# Patient Record
Sex: MALE | Race: ASIAN | NOT HISPANIC OR LATINO | URBAN - METROPOLITAN AREA
[De-identification: names, ages, dates, MRNs, and addresses within clinical notes are randomized per-mention and may not be internally consistent; named-entity substitution may affect disease eponyms.]

---

## 2024-11-07 ENCOUNTER — INPATIENT (INPATIENT)
Facility: HOSPITAL | Age: 69
LOS: 0 days | Discharge: HOME | End: 2024-11-08
Attending: STUDENT IN AN ORGANIZED HEALTH CARE EDUCATION/TRAINING PROGRAM | Admitting: STUDENT IN AN ORGANIZED HEALTH CARE EDUCATION/TRAINING PROGRAM
Payer: COMMERCIAL

## 2024-11-07 VITALS
WEIGHT: 130.95 LBS | HEIGHT: 62 IN | DIASTOLIC BLOOD PRESSURE: 90 MMHG | TEMPERATURE: 98 F | RESPIRATION RATE: 16 BRPM | OXYGEN SATURATION: 96 % | SYSTOLIC BLOOD PRESSURE: 187 MMHG | HEART RATE: 95 BPM

## 2024-11-07 DIAGNOSIS — R07.9 CHEST PAIN, UNSPECIFIED: ICD-10-CM

## 2024-11-07 DIAGNOSIS — Z98.890 OTHER SPECIFIED POSTPROCEDURAL STATES: Chronic | ICD-10-CM

## 2024-11-07 DIAGNOSIS — I10 ESSENTIAL (PRIMARY) HYPERTENSION: ICD-10-CM

## 2024-11-07 DIAGNOSIS — H72.90 UNSPECIFIED PERFORATION OF TYMPANIC MEMBRANE, UNSPECIFIED EAR: Chronic | ICD-10-CM

## 2024-11-07 DIAGNOSIS — Z98.62 PERIPHERAL VASCULAR ANGIOPLASTY STATUS: Chronic | ICD-10-CM

## 2024-11-07 DIAGNOSIS — E78.5 HYPERLIPIDEMIA, UNSPECIFIED: ICD-10-CM

## 2024-11-07 DIAGNOSIS — E11.65 TYPE 2 DIABETES MELLITUS WITH HYPERGLYCEMIA: ICD-10-CM

## 2024-11-07 LAB
A1C WITH ESTIMATED AVERAGE GLUCOSE RESULT: 8.3 % — HIGH (ref 4–5.6)
ADD ON TEST-SPECIMEN IN LAB: SIGNIFICANT CHANGE UP
ANION GAP SERPL CALC-SCNC: 17 MMOL/L — HIGH (ref 7–14)
BUN SERPL-MCNC: 18 MG/DL — SIGNIFICANT CHANGE UP (ref 7–23)
CALCIUM SERPL-MCNC: 9.8 MG/DL — SIGNIFICANT CHANGE UP (ref 8.4–10.5)
CHLORIDE SERPL-SCNC: 99 MMOL/L — SIGNIFICANT CHANGE UP (ref 98–107)
CO2 SERPL-SCNC: 22 MMOL/L — SIGNIFICANT CHANGE UP (ref 22–31)
CREAT SERPL-MCNC: 1.41 MG/DL — HIGH (ref 0.5–1.3)
EGFR: 54 ML/MIN/1.73M2 — LOW
ESTIMATED AVERAGE GLUCOSE: 192 — SIGNIFICANT CHANGE UP
GLUCOSE BLDC GLUCOMTR-MCNC: 138 MG/DL — HIGH (ref 70–99)
GLUCOSE BLDC GLUCOMTR-MCNC: 163 MG/DL — HIGH (ref 70–99)
GLUCOSE BLDC GLUCOMTR-MCNC: 169 MG/DL — HIGH (ref 70–99)
GLUCOSE BLDC GLUCOMTR-MCNC: 179 MG/DL — HIGH (ref 70–99)
GLUCOSE BLDC GLUCOMTR-MCNC: 224 MG/DL — HIGH (ref 70–99)
GLUCOSE SERPL-MCNC: 176 MG/DL — HIGH (ref 70–99)
HCT VFR BLD CALC: 50.1 % — HIGH (ref 39–50)
HGB BLD-MCNC: 16.7 G/DL — SIGNIFICANT CHANGE UP (ref 13–17)
MCHC RBC-ENTMCNC: 32.2 PG — SIGNIFICANT CHANGE UP (ref 27–34)
MCHC RBC-ENTMCNC: 33.3 G/DL — SIGNIFICANT CHANGE UP (ref 32–36)
MCV RBC AUTO: 96.7 FL — SIGNIFICANT CHANGE UP (ref 80–100)
NRBC # BLD: 0 /100 WBCS — SIGNIFICANT CHANGE UP (ref 0–0)
NRBC # FLD: 0 K/UL — SIGNIFICANT CHANGE UP (ref 0–0)
PLATELET # BLD AUTO: 123 K/UL — LOW (ref 150–400)
POTASSIUM SERPL-MCNC: 4.3 MMOL/L — SIGNIFICANT CHANGE UP (ref 3.5–5.3)
POTASSIUM SERPL-SCNC: 4.3 MMOL/L — SIGNIFICANT CHANGE UP (ref 3.5–5.3)
RBC # BLD: 5.18 M/UL — SIGNIFICANT CHANGE UP (ref 4.2–5.8)
RBC # FLD: 12.7 % — SIGNIFICANT CHANGE UP (ref 10.3–14.5)
SODIUM SERPL-SCNC: 138 MMOL/L — SIGNIFICANT CHANGE UP (ref 135–145)
WBC # BLD: 6.61 K/UL — SIGNIFICANT CHANGE UP (ref 3.8–10.5)
WBC # FLD AUTO: 6.61 K/UL — SIGNIFICANT CHANGE UP (ref 3.8–10.5)

## 2024-11-07 PROCEDURE — 99254 IP/OBS CNSLTJ NEW/EST MOD 60: CPT | Mod: GC

## 2024-11-07 PROCEDURE — 99232 SBSQ HOSP IP/OBS MODERATE 35: CPT

## 2024-11-07 PROCEDURE — 93010 ELECTROCARDIOGRAM REPORT: CPT

## 2024-11-07 RX ORDER — SODIUM CHLORIDE 9 MG/ML
1000 INJECTION, SOLUTION INTRAMUSCULAR; INTRAVENOUS; SUBCUTANEOUS
Refills: 0 | Status: DISCONTINUED | OUTPATIENT
Start: 2024-11-07 | End: 2024-11-08

## 2024-11-07 RX ORDER — LOSARTAN POTASSIUM 25 MG/1
100 TABLET ORAL DAILY
Refills: 0 | Status: DISCONTINUED | OUTPATIENT
Start: 2024-11-07 | End: 2024-11-08

## 2024-11-07 RX ORDER — INSULIN GLARGINE,HUM.REC.ANLOG 100/ML
32 VIAL (ML) SUBCUTANEOUS AT BEDTIME
Refills: 0 | Status: DISCONTINUED | OUTPATIENT
Start: 2024-11-07 | End: 2024-11-07

## 2024-11-07 RX ORDER — SODIUM CHLORIDE 9 MG/ML
500 INJECTION, SOLUTION INTRAMUSCULAR; INTRAVENOUS; SUBCUTANEOUS
Refills: 0 | Status: DISCONTINUED | OUTPATIENT
Start: 2024-11-07 | End: 2024-11-08

## 2024-11-07 RX ORDER — MONTELUKAST SODIUM 10 MG/1
10 TABLET, FILM COATED ORAL AT BEDTIME
Refills: 0 | Status: DISCONTINUED | OUTPATIENT
Start: 2024-11-07 | End: 2024-11-08

## 2024-11-07 RX ORDER — CLOPIDOGREL 75 MG/1
75 TABLET ORAL DAILY
Refills: 0 | Status: DISCONTINUED | OUTPATIENT
Start: 2024-11-08 | End: 2024-11-08

## 2024-11-07 RX ORDER — INSULIN GLARGINE,HUM.REC.ANLOG 100/ML
34 VIAL (ML) SUBCUTANEOUS AT BEDTIME
Refills: 0 | Status: DISCONTINUED | OUTPATIENT
Start: 2024-11-07 | End: 2024-11-08

## 2024-11-07 RX ORDER — INSULIN LISPRO 100/ML
6 VIAL (ML) SUBCUTANEOUS
Refills: 0 | Status: DISCONTINUED | OUTPATIENT
Start: 2024-11-07 | End: 2024-11-08

## 2024-11-07 RX ORDER — GABAPENTIN 300 MG/1
1 CAPSULE ORAL
Refills: 0 | DISCHARGE

## 2024-11-07 RX ORDER — SODIUM CHLORIDE 9 MG/ML
250 INJECTION, SOLUTION INTRAMUSCULAR; INTRAVENOUS; SUBCUTANEOUS ONCE
Refills: 0 | Status: COMPLETED | OUTPATIENT
Start: 2024-11-07 | End: 2024-11-07

## 2024-11-07 RX ORDER — FLUTICASONE FUROATE AND VILANTEROL 100; 25 UG/1; UG/1
1 POWDER RESPIRATORY (INHALATION)
Refills: 0 | DISCHARGE

## 2024-11-07 RX ORDER — GABAPENTIN 300 MG/1
300 CAPSULE ORAL DAILY
Refills: 0 | Status: DISCONTINUED | OUTPATIENT
Start: 2024-11-07 | End: 2024-11-08

## 2024-11-07 RX ORDER — INSULIN LISPRO 100/ML
VIAL (ML) SUBCUTANEOUS
Refills: 0 | Status: DISCONTINUED | OUTPATIENT
Start: 2024-11-07 | End: 2024-11-08

## 2024-11-07 RX ORDER — FLUTICASONE PROPIONATE AND SALMETEROL XINAFOATE 230; 21 UG/1; UG/1
1 AEROSOL, METERED RESPIRATORY (INHALATION)
Refills: 0 | Status: DISCONTINUED | OUTPATIENT
Start: 2024-11-07 | End: 2024-11-08

## 2024-11-07 RX ORDER — PANTOPRAZOLE SODIUM 40 MG/1
1 TABLET, DELAYED RELEASE ORAL
Refills: 0 | DISCHARGE

## 2024-11-07 RX ORDER — SODIUM CHLORIDE 9 MG/ML
3 INJECTION, SOLUTION INTRAMUSCULAR; INTRAVENOUS; SUBCUTANEOUS EVERY 8 HOURS
Refills: 0 | Status: DISCONTINUED | OUTPATIENT
Start: 2024-11-07 | End: 2024-11-08

## 2024-11-07 RX ORDER — INSULIN LISPRO 100/ML
VIAL (ML) SUBCUTANEOUS AT BEDTIME
Refills: 0 | Status: DISCONTINUED | OUTPATIENT
Start: 2024-11-07 | End: 2024-11-08

## 2024-11-07 RX ORDER — PANTOPRAZOLE SODIUM 40 MG/1
40 TABLET, DELAYED RELEASE ORAL
Refills: 0 | Status: DISCONTINUED | OUTPATIENT
Start: 2024-11-07 | End: 2024-11-08

## 2024-11-07 RX ORDER — MONTELUKAST SODIUM 10 MG/1
1 TABLET, FILM COATED ORAL
Refills: 0 | DISCHARGE

## 2024-11-07 RX ORDER — GLUCAGON INJECTION, SOLUTION 1 MG/.2ML
1 INJECTION, SOLUTION SUBCUTANEOUS ONCE
Refills: 0 | Status: DISCONTINUED | OUTPATIENT
Start: 2024-11-07 | End: 2024-11-08

## 2024-11-07 RX ORDER — LOSARTAN POTASSIUM 25 MG/1
1 TABLET ORAL
Refills: 0 | DISCHARGE

## 2024-11-07 RX ORDER — ASPIRIN/MAG CARB/ALUMINUM AMIN 325 MG
81 TABLET ORAL DAILY
Refills: 0 | Status: DISCONTINUED | OUTPATIENT
Start: 2024-11-08 | End: 2024-11-08

## 2024-11-07 RX ADMIN — Medication 2: at 15:01

## 2024-11-07 RX ADMIN — GABAPENTIN 300 MILLIGRAM(S): 300 CAPSULE ORAL at 15:00

## 2024-11-07 RX ADMIN — LOSARTAN POTASSIUM 100 MILLIGRAM(S): 25 TABLET ORAL at 15:00

## 2024-11-07 RX ADMIN — SODIUM CHLORIDE 3 MILLILITER(S): 9 INJECTION, SOLUTION INTRAMUSCULAR; INTRAVENOUS; SUBCUTANEOUS at 22:11

## 2024-11-07 RX ADMIN — Medication 40 MILLIGRAM(S): at 22:22

## 2024-11-07 RX ADMIN — SODIUM CHLORIDE 1000 MILLILITER(S): 9 INJECTION, SOLUTION INTRAMUSCULAR; INTRAVENOUS; SUBCUTANEOUS at 08:04

## 2024-11-07 RX ADMIN — Medication 1: at 17:28

## 2024-11-07 RX ADMIN — SODIUM CHLORIDE 75 MILLILITER(S): 9 INJECTION, SOLUTION INTRAMUSCULAR; INTRAVENOUS; SUBCUTANEOUS at 08:04

## 2024-11-07 RX ADMIN — Medication 34 UNIT(S): at 22:36

## 2024-11-07 RX ADMIN — PANTOPRAZOLE SODIUM 40 MILLIGRAM(S): 40 TABLET, DELAYED RELEASE ORAL at 15:00

## 2024-11-07 RX ADMIN — SODIUM CHLORIDE 100 MILLILITER(S): 9 INJECTION, SOLUTION INTRAMUSCULAR; INTRAVENOUS; SUBCUTANEOUS at 11:20

## 2024-11-07 RX ADMIN — SODIUM CHLORIDE 3 MILLILITER(S): 9 INJECTION, SOLUTION INTRAMUSCULAR; INTRAVENOUS; SUBCUTANEOUS at 14:00

## 2024-11-07 RX ADMIN — FLUTICASONE PROPIONATE AND SALMETEROL XINAFOATE 1 DOSE(S): 230; 21 AEROSOL, METERED RESPIRATORY (INHALATION) at 22:22

## 2024-11-07 RX ADMIN — MONTELUKAST SODIUM 10 MILLIGRAM(S): 10 TABLET, FILM COATED ORAL at 22:22

## 2024-11-07 NOTE — CHART NOTE - NSCHARTNOTEFT_GEN_A_CORE
The patient is s/p PCI to LAD. He is on Xarelto 2.5 mg PO BID for PAD. As per Dr. Phillips, will d/c Xarelto and start the patient on ASA + Plavix.

## 2024-11-07 NOTE — ADVANCED PRACTICE NURSE CONSULT - REASON FOR CONSULT
69 y.o. male with PMH of HTN, HLD, Diabetes Mellitus 2 (on Insulin pump), PAD on Xarelto, COPD, [place], Peripheral neuropathy-  presents today for elective cardiac catheterization due to abnormal stress test.  [facility] uses simplicity insulin pump at home.

## 2024-11-07 NOTE — CONSULT NOTE ADULT - SUBJECTIVE AND OBJECTIVE BOX
Oseas Zuniga MD   |   PGY-4  Endocrinology Fellow  Available on Microsoft Teams    HPI:  69 y.o. male with PMH of HTN, HLD, Diabetes Mellitus 2 (on Insulin pump), PAD on Xarelto, COPD, [place], Peripheral neuropathy-  presents today for elective cardiac catheterization due to abnormal stress test.  The patient denies chest pain, SOB, palpitations, presyncope, syncope,  headache, visual disturbances, CVA, PE, DVT, JANI, abdominal pain, N/V/D/C, hematochezia, melena, dysuria, hematuria, fever, chills. The patient was evaluated by a cardiologist. Due to patient's symptoms and abnormal non invasive findings, the patient  was recommended to have cardiac catheterization. The patient denies any complaints at present.     referring physician, Dr. Shaik Phillips  stress test 10/19/2024 - EF 64%, anteroseptal wall moderate ischemia perfusion defect.   (07 Nov 2024 07:48)      Endocrine History:  Endocrine consulted T2DM/insulin patch monitoring.      --Type of Diabetes: Not sure what type   --Diagnosis: Since age 40s   --A1c: 8.3%   --Outpatient regimen: Cecur Symplicity patch (8 units before breakfast, 12 units before lunch, does take it before dinner because he was told doesn’t needle, Toujeo 36 units before dinner (last night took 32 units)  Synjardy(Empagliflozin/metformin) 12.5/500 mg PO daily   --Endocrinologist: Dr Eliane Castro  --Adherence: Good   --Side effects to medications: Patient denies diarrhea, decreased appetite, nausea/vomiting, weight gain/loss, lower leg swelling, UTIs, pancreatitis, thyroid cancer, family hx of thyroid Cancer/MEN   --Past Medications: metformin – stopped when changed to synjardy   --who administers medications? Himself   --live with: WIfe   --Home sugars/log: Dexcom G7 – 100s -120 in AM. Sometimes in 200s after meals   --Any hypoglycemic episodes? Denies    --Any hx of DKA? Denies   --Complications: Reports PAD. Denies MI, CVA, Retinopathy, Neuropathy,  Nephropathy   --Family history: Mother – not sure what type   --Outpatient Diet: Denies juice, soda or sweetened iced tea/drinks. Denies sweets, candies, desserts, chips for snacks. Denies take-out or fast food. Eats Vegetables daily. Some chicken, fish. Small rice with lunch and dinner   --Appetite: Good   --% of meals eaten today/yesterday? No breakfast, ate lunch   --Follow up: Dr. Eliane Castro – has appt in 2 weeks   --Insurance: [place] [id number]   --Statin: atorvastatin 40mg   --ACE/ARB: losartan 100mg--Inpatient diabetes regimen? 32/low/low   --BMI:   24      weight: 59.4   --GFR? 54     PAST MEDICAL & SURGICAL HISTORY:  HTN (hypertension), benign      HLD (hyperlipidemia)      DM2 (diabetes mellitus, type 2)      Chronic obstructive pulmonary disease, unspecified COPD type      Peripheral neuropathy      [place] (hard of hearing)      Diabetes mellitus type 2, uncomplicated, on long term insulin pump      Perforated eardrum      H/O foot surgery      Status post peripheral artery angioplasty          MEDICATIONS  (STANDING):  atorvastatin 40 milliGRAM(s) Oral at bedtime  dextrose 5%. 1000 milliLiter(s) (50 mL/Hr) IV Continuous <Continuous>  dextrose 5%. 1000 milliLiter(s) (100 mL/Hr) IV Continuous <Continuous>  dextrose 50% Injectable 25 Gram(s) IV Push once  dextrose 50% Injectable 25 Gram(s) IV Push once  dextrose 50% Injectable 12.5 Gram(s) IV Push once  fluticasone propionate/ salmeterol 100-50 MICROgram(s) Diskus 1 Dose(s) Inhalation two times a day  gabapentin 300 milliGRAM(s) Oral daily  glucagon  Injectable 1 milliGRAM(s) IntraMuscular once  insulin glargine Injectable (LANTUS) 34 Unit(s) SubCutaneous at bedtime  insulin lispro (ADMELOG) corrective regimen sliding scale   SubCutaneous three times a day before meals  insulin lispro (ADMELOG) corrective regimen sliding scale   SubCutaneous at bedtime  insulin lispro Injectable (ADMELOG) 6 Unit(s) SubCutaneous three times a day before meals  losartan 100 milliGRAM(s) Oral daily  montelukast 10 milliGRAM(s) Oral at bedtime  pantoprazole    Tablet 40 milliGRAM(s) Oral before breakfast  sodium chloride 0.9% lock flush 3 milliLiter(s) IV Push every 8 hours  sodium chloride 0.9%. 500 milliLiter(s) (75 mL/Hr) IV Continuous <Continuous>  sodium chloride 0.9%. 1000 milliLiter(s) (100 mL/Hr) IV Continuous <Continuous>    MEDICATIONS  (PRN):  dextrose Oral Gel 15 Gram(s) Oral once PRN Blood Glucose LESS THAN 70 milliGRAM(s)/deciliter      Allergies    pork (Urticaria)  Beef (Urticaria)  No Known Drug Allergies    Intolerances      Review of Systems:  Constitutional: No fever  Eyes: No blurry vision  Neuro: No tremors  HEENT: No pain  Cardiovascular: No chest pain, palpitations  Respiratory: No SOB, no cough  GI: No nausea, vomiting, abdominal pain  : No dysuria  Skin: no rash  Psych: no depression  Endocrine: no polyuria, polydipsia  Hem/lymph: no swelling  Osteoporosis: no fractures    ===================PHYSICAL EXAM=======================  VITALS: T(C): 36.6 (11-07-24 @ 14:41)  T(F): 97.9 (11-07-24 @ 14:41), Max: 98.1 (11-07-24 @ 07:12)  HR: 84 (11-07-24 @ 14:41) (84 - 96)  BP: 177/96 (11-07-24 @ 14:41) (158/85 - 187/90)  RR:  (14 - 22)  SpO2:  (96% - 99%)  Wt(kg): --  GENERAL: NAD  EYES: No proptosis, no lid lag, anicteric  THYROID: Normal size, no palpable nodules  RESPIRATORY: Clear to auscultation bilaterally  CARDIOVASCULAR: Regular rate and rhythm  GI: Soft, nontender, non distended  EXT: b/l feet without wounds; 2+ pulses  PSYCH: Alert and oriented x 3, reactive mood  ======================================================  POCT Blood Glucose.: 179 mg/dL (11-07-24 @ 17:23)  POCT Blood Glucose.: 224 mg/dL (11-07-24 @ 14:17)  POCT Blood Glucose.: 138 mg/dL (11-07-24 @ 09:47)  POCT Blood Glucose.: 163 mg/dL (11-07-24 @ 07:11)                            16.7   6.61  )-----------( 123      ( 07 Nov 2024 07:12 )             50.1       11-07    138  |  99  |  18  ----------------------------<  176[H]  4.3   |  22  |  1.41[H]    eGFR: 54[L]    Ca    9.8      11-07        Thyroid Function Tests:      A1C with Estimated Average Glucose Result: 8.3 % (11-07-24 @ 07:12)          Radiology:              Oseas Zuniga MD   |   PGY-4  Endocrinology Fellow  Available on Microsoft Teams    HPI:  69 y.o. male with PMH of HTN, HLD, Diabetes Mellitus 2 (on Insulin pump), PAD on Xarelto, COPD, [place], Peripheral neuropathy-  presents today for elective cardiac catheterization due to abnormal stress test.  The patient denies chest pain, SOB, palpitations, presyncope, syncope,  headache, visual disturbances, CVA, PE, DVT, JANI, abdominal pain, N/V/D/C, hematochezia, melena, dysuria, hematuria, fever, chills. The patient was evaluated by a cardiologist. Due to patient's symptoms and abnormal non invasive findings, the patient  was recommended to have cardiac catheterization. The patient denies any complaints at present.     referring physician, Dr. Shaik Phillips  stress test 10/19/2024 - EF 64%, anteroseptal wall moderate ischemia perfusion defect.   (07 Nov 2024 07:48)      Endocrine History:  Endocrine consulted T2DM/insulin patch monitoring.      --Type of Diabetes: Not sure what type   --Diagnosis: Since age 40s   --A1c: 8.3%   --Outpatient regimen: Cequr Symplicity patch (8 units before breakfast, 12 units before lunch, does take it before dinner because he was told doesn’t needle, Toujeo 36 units before dinner (last night took 32 units)  Synjardy(Empagliflozin/metformin) 12.5/500 mg PO daily   --Endocrinologist: Dr Eliane Castro  --Adherence: Good   --Side effects to medications: Patient denies diarrhea, decreased appetite, nausea/vomiting, weight gain/loss, lower leg swelling, UTIs, pancreatitis, thyroid cancer, family hx of thyroid Cancer/MEN   --Past Medications: metformin – stopped when changed to synjardy   --who administers medications? Himself   --live with: WIfe   --Home sugars/log: Dexcom G7 – 100s -120 in AM. Sometimes in 200s after meals   --Any hypoglycemic episodes? Denies    --Any hx of DKA? Denies   --Complications: Reports PAD. Denies MI, CVA, Retinopathy, Neuropathy,  Nephropathy   --Family history: Mother – not sure what type   --Outpatient Diet: Denies juice, soda or sweetened iced tea/drinks. Denies sweets, candies, desserts, chips for snacks. Denies take-out or fast food. Eats Vegetables daily. Some chicken, fish. Small rice with lunch and dinner   --Appetite: Good   --% of meals eaten today/yesterday? No breakfast, ate lunch   --Follow up: Dr. Eliane Castro – has appt in 2 weeks   --Insurance: [place] [id number]   --Statin: atorvastatin 40mg   --ACE/ARB: losartan 100mg--Inpatient diabetes regimen? 32/low/low   --BMI:   24      weight: 59.4   --GFR? 54     PAST MEDICAL & SURGICAL HISTORY:  HTN (hypertension), benign      HLD (hyperlipidemia)      DM2 (diabetes mellitus, type 2)      Chronic obstructive pulmonary disease, unspecified COPD type      Peripheral neuropathy      [place] (hard of hearing)      Diabetes mellitus type 2, uncomplicated, on long term insulin pump      Perforated eardrum      H/O foot surgery      Status post peripheral artery angioplasty          MEDICATIONS  (STANDING):  atorvastatin 40 milliGRAM(s) Oral at bedtime  dextrose 5%. 1000 milliLiter(s) (50 mL/Hr) IV Continuous <Continuous>  dextrose 5%. 1000 milliLiter(s) (100 mL/Hr) IV Continuous <Continuous>  dextrose 50% Injectable 25 Gram(s) IV Push once  dextrose 50% Injectable 25 Gram(s) IV Push once  dextrose 50% Injectable 12.5 Gram(s) IV Push once  fluticasone propionate/ salmeterol 100-50 MICROgram(s) Diskus 1 Dose(s) Inhalation two times a day  gabapentin 300 milliGRAM(s) Oral daily  glucagon  Injectable 1 milliGRAM(s) IntraMuscular once  insulin glargine Injectable (LANTUS) 34 Unit(s) SubCutaneous at bedtime  insulin lispro (ADMELOG) corrective regimen sliding scale   SubCutaneous three times a day before meals  insulin lispro (ADMELOG) corrective regimen sliding scale   SubCutaneous at bedtime  insulin lispro Injectable (ADMELOG) 6 Unit(s) SubCutaneous three times a day before meals  losartan 100 milliGRAM(s) Oral daily  montelukast 10 milliGRAM(s) Oral at bedtime  pantoprazole    Tablet 40 milliGRAM(s) Oral before breakfast  sodium chloride 0.9% lock flush 3 milliLiter(s) IV Push every 8 hours  sodium chloride 0.9%. 500 milliLiter(s) (75 mL/Hr) IV Continuous <Continuous>  sodium chloride 0.9%. 1000 milliLiter(s) (100 mL/Hr) IV Continuous <Continuous>    MEDICATIONS  (PRN):  dextrose Oral Gel 15 Gram(s) Oral once PRN Blood Glucose LESS THAN 70 milliGRAM(s)/deciliter      Allergies    pork (Urticaria)  Beef (Urticaria)  No Known Drug Allergies    Intolerances      Review of Systems:  Constitutional: No fever  Eyes: No blurry vision  Neuro: No tremors  HEENT: No pain  Cardiovascular: No chest pain, palpitations  Respiratory: No SOB, no cough  GI: No nausea, vomiting, abdominal pain  : No dysuria  Skin: no rash  Psych: no depression  Endocrine: no polyuria, polydipsia  Hem/lymph: no swelling  Osteoporosis: no fractures    ===================PHYSICAL EXAM=======================  VITALS: T(C): 36.6 (11-07-24 @ 14:41)  T(F): 97.9 (11-07-24 @ 14:41), Max: 98.1 (11-07-24 @ 07:12)  HR: 84 (11-07-24 @ 14:41) (84 - 96)  BP: 177/96 (11-07-24 @ 14:41) (158/85 - 187/90)  RR:  (14 - 22)  SpO2:  (96% - 99%)  Wt(kg): --  GENERAL: NAD  EYES: No proptosis, no lid lag, anicteric  THYROID: Normal size, no palpable nodules  RESPIRATORY: Clear to auscultation bilaterally  CARDIOVASCULAR: Regular rate and rhythm  GI: Soft, nontender, non distended  EXT: b/l feet without wounds; 2+ pulses  PSYCH: Alert and oriented x 3, reactive mood  ======================================================  POCT Blood Glucose.: 179 mg/dL (11-07-24 @ 17:23)  POCT Blood Glucose.: 224 mg/dL (11-07-24 @ 14:17)  POCT Blood Glucose.: 138 mg/dL (11-07-24 @ 09:47)  POCT Blood Glucose.: 163 mg/dL (11-07-24 @ 07:11)                            16.7   6.61  )-----------( 123      ( 07 Nov 2024 07:12 )             50.1       11-07    138  |  99  |  18  ----------------------------<  176[H]  4.3   |  22  |  1.41[H]    eGFR: 54[L]    Ca    9.8      11-07        Thyroid Function Tests:      A1C with Estimated Average Glucose Result: 8.3 % (11-07-24 @ 07:12)          Radiology:

## 2024-11-07 NOTE — ADVANCED PRACTICE NURSE CONSULT - RECOMMEDATIONS
If patient with be admitted, endocrine team needs to be consulted. Patient instructed to follow up with endocrinologist. Patient stated he does have an appointment scheduled.

## 2024-11-07 NOTE — H&P CARDIOLOGY - NSICDXPASTMEDICALHX_GEN_ALL_CORE_FT
PAST MEDICAL HISTORY:  Chronic obstructive pulmonary disease, unspecified COPD type     Diabetes mellitus type 2, uncomplicated, on long term insulin pump     DM2 (diabetes mellitus, type 2)     HLD (hyperlipidemia)     [place] (hard of hearing)     HTN (hypertension), benign     Peripheral neuropathy

## 2024-11-07 NOTE — H&P CARDIOLOGY - NSICDXPASTSURGICALHX_GEN_ALL_CORE_FT
PAST SURGICAL HISTORY:  H/O foot surgery     Perforated eardrum     Status post peripheral artery angioplasty

## 2024-11-07 NOTE — ADVANCED PRACTICE NURSE CONSULT - ASSESSMENT
Saw patient prior to angiogram. Patient with type 2 diabetes. Patient was on oral medication for few years, has been on insulin for 10-12 years and now on Simplicity insulin pump for last 6 months. Patient takes up to 6 units ac breakfast and lunch. Patient takes Toujeo with dinner and does not take any rapid acting insulin with dinner. Patient simplicity pump located in left abdomen ; site is intact. Patient due to change today. Will be removed prior to the procedure and patient can be restarted at home if the plan is to go home after the procedure. Ina not available at time of consult for full medication review. Patient has hypoglycemia unawareness and feels s/s of low blood sugar at 60mg/dl or below. patient states BG has gone in 40’s. Patient has a Dex Com CGM which he states gives him enough warning of low blood sugars. Patient was instructed that the CGM could be set at a different number if patient was not feeling he was getting enough warning. Patient has Dex Com CGM located on right arm; site intact. Patient was instructed that for more accurate readings CGM should be placed in the back of the arm. Patient stated that it is hard for him to take it off in the back of the arm that is why he put it in this area. Patient was instructed that if he ever get a discrepancy between BG not matched symptoms or not matching blood sugar readings it might be site related. Patient just eats when BG is low. Patient was instructed on proper treatment of hypoglycemia. Patient demonstrated understanding of information taught.

## 2024-11-07 NOTE — H&P CARDIOLOGY - COMMENTS
Pre-sedation evaluation    Dentures: none  Last PO intake:11/6/24 19:00    Level of consciousness: 1  Obstructive sleep apnea: No  Aspiration risk: No  Mallampati score: 2  ASA Classification: 2  Prior Sedative or Anesthesia Experience: No complications  Informed consent by responsible adult: Yes  Responsible adult escort: Yes  Based on today's assessment, anesthesia consult requested: No

## 2024-11-07 NOTE — CONSULT NOTE ADULT - ATTENDING COMMENTS
69M DM2 on Cequr simplicity patch pump here for workup of abnormal stress test. Inpatient off patch pump. Agree with basal bolus insulin as outlined,     Radha Nagy MD  Division of Endocrinology  Pager: [phone]    If after 6PM or before 9AM, or on weekends/holidays, please call endocrine answering service for assistance ([phone]).  For nonurgent matters email [email or web address] for assistance.

## 2024-11-07 NOTE — H&P CARDIOLOGY - HISTORY OF PRESENT ILLNESS
69 y.o. male with PMH of HTN, HLD, Diabetes Mellitus 2 (on Insulin pump), PAD on Xarelto, COPD, [place], Peripheral neuropathy-  presents today for elective cardiac catheterization due to abnormal stress test.  The patient denies chest pain, SOB, palpitations, presyncope, syncope,  headache, visual disturbances, CVA, PE, DVT, JANI, abdominal pain, N/V/D/C, hematochezia, melena, dysuria, hematuria, fever, chills. The patient was evaluated by a cardiologist. Due to patient's symptoms and abnormal non invasive findings, the patient  was recommended to have cardiac catheterization. The patient denies any complaints at present.     referring physician, Dr. Shaik Phillips  stress test 10/19/2024 - EF 64%, anteroseptal wall moderate ischemia perfusion defect.

## 2024-11-07 NOTE — CONSULT NOTE ADULT - ASSESSMENT
69M hx of HTN, HLD, Diabetes Mellitus 2 (on Insulin patch), PAD on Xarelto, COPD, [place], Peripheral neuropathy-  presents today for elective cardiac catheterization due to abnormal stress test. Patient to be admitted. Endocrine consulted T2DM/insulin patch monitoring.     #T2DM (per chart review - patient not sure what type of DM he has)  --Diagnosis: Since age 40s   --A1c: 8.3%   --Outpatient regimen: Cecur Symplicity patch (8 units before breakfast, 12 units before lunch, does take it before dinner because he was told doesn’t needle, Toujeo 36 units before dinner (last night took 32 units)  Synjardy(Empagliflozin/metformin) 12.5/500 mg PO daily     INPATIENT RECOMMENDATIONS:  - start Lantus 34 units at bedtime  - start Admelog 6 units premeal TID (HOLD IF NPO)  - start low admelog correctional scale premeal   - start low admelog correctional scale at bedtime    DISCHARGE RECOMMENDATIONS:  - Patient should start taking premeal insulin before dinner (currently only taking before breakfast and lunch via Cecur Symplicity patch), Continue Toujeo 36 units at bedtime and Synjardy(Empagliflozin/metformin) 12.5/500 mg PO daily   - CGM: Patient uses Dexcom G7 at home  - OUT-PATIENT FOLLOW UP: Patient should follow up with Endocrinologist Dr Eliane Castro    #HLD  - patient on atorvastatin 40mg  - goal LDL in diabetes mellitus is <70    #HTN  - patient on losartan 100mg daily  - goal BP in diabetes mellitus is <130/80  - defer to primary team for management      Discussed recommendations with primary team.    Oseas Zuniga MD  Endocrine Fellow  Can be reached via [employer] teams.    For follow up questions, discharge recommendations, or new consults, please email [email or web address] ([email or web address]) or [email or web address] ([facility]) or call answering service at [phone] (weekdays); [phone] (nights/weekends).  For emergencies please page fellow on call.     69M hx of HTN, HLD, Diabetes Mellitus 2 (on Insulin patch), PAD on Xarelto, COPD, [place], Peripheral neuropathy-  presents today for elective cardiac catheterization due to abnormal stress test. Patient to be admitted. Endocrine consulted T2DM/insulin patch monitoring.     #T2DM (per chart review - patient not sure what type of DM he has)  --Diagnosis: Since age 40s   --A1c: 8.3%   --Outpatient regimen: Cequr Symplicity patch (8 units before breakfast, 12 units before lunch, does take it before dinner because he was told doesn’t needle, Toujeo 36 units before dinner (last night took 32 units)  Synjardy(Empagliflozin/metformin) 12.5/500 mg PO daily     INPATIENT RECOMMENDATIONS: Cequr patch is currently off  - start Lantus 34 units at bedtime  - start Admelog 6 units premeal TID (HOLD IF NPO)  - start low admelog correctional scale premeal   - start low admelog correctional scale at bedtime    DISCHARGE RECOMMENDATIONS:  - Patient should start taking premeal insulin before dinner (currently only taking before breakfast and lunch via Cequr Symplicity patch), Continue Toujeo 36 units at bedtime and Synjardy(Empagliflozin/metformin) 12.5/500 mg PO daily   - CGM: Patient uses Dexcom G7 at home  - OUT-PATIENT FOLLOW UP: Patient should follow up with Endocrinologist Dr Eliane Castro    #HLD  - patient on atorvastatin 40mg  - goal LDL in diabetes mellitus is <70    #HTN  - patient on losartan 100mg daily  - goal BP in diabetes mellitus is <130/80  - defer to primary team for management      Discussed recommendations with primary team.    Oseas Zuniga MD  Endocrine Fellow  Can be reached via [employer] teams.    For follow up questions, discharge recommendations, or new consults, please email [email or web address] ([email or web address]) or [email or web address] ([facility]) or call answering service at [phone] (weekdays); [phone] (nights/weekends).  For emergencies please page fellow on call.

## 2024-11-08 ENCOUNTER — TRANSCRIPTION ENCOUNTER (OUTPATIENT)
Age: 69
End: 2024-11-08

## 2024-11-08 VITALS
DIASTOLIC BLOOD PRESSURE: 92 MMHG | RESPIRATION RATE: 20 BRPM | HEART RATE: 90 BPM | SYSTOLIC BLOOD PRESSURE: 149 MMHG | OXYGEN SATURATION: 100 %

## 2024-11-08 LAB
A1C WITH ESTIMATED AVERAGE GLUCOSE RESULT: 8.1 % — HIGH (ref 4–5.6)
ANION GAP SERPL CALC-SCNC: 12 MMOL/L — SIGNIFICANT CHANGE UP (ref 7–14)
BUN SERPL-MCNC: 23 MG/DL — SIGNIFICANT CHANGE UP (ref 7–23)
CALCIUM SERPL-MCNC: 9.4 MG/DL — SIGNIFICANT CHANGE UP (ref 8.4–10.5)
CHLORIDE SERPL-SCNC: 103 MMOL/L — SIGNIFICANT CHANGE UP (ref 98–107)
CO2 SERPL-SCNC: 22 MMOL/L — SIGNIFICANT CHANGE UP (ref 22–31)
CREAT SERPL-MCNC: 1.32 MG/DL — HIGH (ref 0.5–1.3)
EGFR: 58 ML/MIN/1.73M2 — LOW
ESTIMATED AVERAGE GLUCOSE: 186 — SIGNIFICANT CHANGE UP
GLUCOSE BLDC GLUCOMTR-MCNC: 134 MG/DL — HIGH (ref 70–99)
GLUCOSE BLDC GLUCOMTR-MCNC: 192 MG/DL — HIGH (ref 70–99)
GLUCOSE BLDC GLUCOMTR-MCNC: 199 MG/DL — HIGH (ref 70–99)
GLUCOSE SERPL-MCNC: 152 MG/DL — HIGH (ref 70–99)
HCT VFR BLD CALC: 47.1 % — SIGNIFICANT CHANGE UP (ref 39–50)
HGB BLD-MCNC: 15.2 G/DL — SIGNIFICANT CHANGE UP (ref 13–17)
MCHC RBC-ENTMCNC: 31.8 PG — SIGNIFICANT CHANGE UP (ref 27–34)
MCHC RBC-ENTMCNC: 32.3 G/DL — SIGNIFICANT CHANGE UP (ref 32–36)
MCV RBC AUTO: 98.5 FL — SIGNIFICANT CHANGE UP (ref 80–100)
NRBC # BLD: 0 /100 WBCS — SIGNIFICANT CHANGE UP (ref 0–0)
NRBC # FLD: 0 K/UL — SIGNIFICANT CHANGE UP (ref 0–0)
PLATELET # BLD AUTO: 109 K/UL — LOW (ref 150–400)
POTASSIUM SERPL-MCNC: 4.3 MMOL/L — SIGNIFICANT CHANGE UP (ref 3.5–5.3)
POTASSIUM SERPL-SCNC: 4.3 MMOL/L — SIGNIFICANT CHANGE UP (ref 3.5–5.3)
RBC # BLD: 4.78 M/UL — SIGNIFICANT CHANGE UP (ref 4.2–5.8)
RBC # FLD: 12.8 % — SIGNIFICANT CHANGE UP (ref 10.3–14.5)
SODIUM SERPL-SCNC: 137 MMOL/L — SIGNIFICANT CHANGE UP (ref 135–145)
WBC # BLD: 5.63 K/UL — SIGNIFICANT CHANGE UP (ref 3.8–10.5)
WBC # FLD AUTO: 5.63 K/UL — SIGNIFICANT CHANGE UP (ref 3.8–10.5)

## 2024-11-08 PROCEDURE — 99232 SBSQ HOSP IP/OBS MODERATE 35: CPT

## 2024-11-08 PROCEDURE — 99238 HOSP IP/OBS DSCHRG MGMT 30/<: CPT

## 2024-11-08 RX ORDER — INSULIN GLARGINE,HUM.REC.ANLOG 100/ML
36 VIAL (ML) SUBCUTANEOUS
Qty: 0 | Refills: 0 | DISCHARGE

## 2024-11-08 RX ORDER — RIVAROXABAN 20 MG/1
1 TABLET, FILM COATED ORAL
Refills: 0 | DISCHARGE

## 2024-11-08 RX ORDER — EMPAGLIFLOZIN AND METFORMIN HYDROCHLORIDE 5; 500 MG/1; MG/1
1 TABLET ORAL
Qty: 0 | Refills: 0 | DISCHARGE

## 2024-11-08 RX ORDER — ISOSORBIDE MONONITRATE 60 MG/1
1 TABLET, EXTENDED RELEASE ORAL
Qty: 30 | Refills: 0
Start: 2024-11-08 | End: 2024-12-07

## 2024-11-08 RX ORDER — ASPIRIN/MAG CARB/ALUMINUM AMIN 325 MG
1 TABLET ORAL
Qty: 30 | Refills: 0
Start: 2024-11-08 | End: 2024-12-07

## 2024-11-08 RX ORDER — CLOPIDOGREL 75 MG/1
1 TABLET ORAL
Qty: 90 | Refills: 0
Start: 2024-11-08 | End: 2025-02-05

## 2024-11-08 RX ORDER — ACETAMINOPHEN 500 MG
650 TABLET ORAL EVERY 6 HOURS
Refills: 0 | Status: DISCONTINUED | OUTPATIENT
Start: 2024-11-08 | End: 2024-11-08

## 2024-11-08 RX ORDER — ISOSORBIDE MONONITRATE 60 MG/1
30 TABLET, EXTENDED RELEASE ORAL DAILY
Refills: 0 | Status: DISCONTINUED | OUTPATIENT
Start: 2024-11-08 | End: 2024-11-08

## 2024-11-08 RX ORDER — MELOXICAM 7.5 MG
1 TABLET ORAL
Refills: 0 | DISCHARGE

## 2024-11-08 RX ADMIN — Medication 6 UNIT(S): at 11:47

## 2024-11-08 RX ADMIN — LOSARTAN POTASSIUM 100 MILLIGRAM(S): 25 TABLET ORAL at 06:25

## 2024-11-08 RX ADMIN — SODIUM CHLORIDE 3 MILLILITER(S): 9 INJECTION, SOLUTION INTRAMUSCULAR; INTRAVENOUS; SUBCUTANEOUS at 06:11

## 2024-11-08 RX ADMIN — FLUTICASONE PROPIONATE AND SALMETEROL XINAFOATE 1 DOSE(S): 230; 21 AEROSOL, METERED RESPIRATORY (INHALATION) at 08:56

## 2024-11-08 RX ADMIN — Medication 6 UNIT(S): at 08:02

## 2024-11-08 RX ADMIN — Medication 650 MILLIGRAM(S): at 08:55

## 2024-11-08 RX ADMIN — Medication 81 MILLIGRAM(S): at 11:21

## 2024-11-08 RX ADMIN — CLOPIDOGREL 75 MILLIGRAM(S): 75 TABLET ORAL at 11:21

## 2024-11-08 RX ADMIN — ISOSORBIDE MONONITRATE 30 MILLIGRAM(S): 60 TABLET, EXTENDED RELEASE ORAL at 15:55

## 2024-11-08 RX ADMIN — Medication 1: at 11:46

## 2024-11-08 RX ADMIN — PANTOPRAZOLE SODIUM 40 MILLIGRAM(S): 40 TABLET, DELAYED RELEASE ORAL at 06:25

## 2024-11-08 RX ADMIN — GABAPENTIN 300 MILLIGRAM(S): 300 CAPSULE ORAL at 11:21

## 2024-11-08 NOTE — DISCHARGE NOTE PROVIDER - NSDCFUADDAPPT_GEN_ALL_CORE_FT
STOP XARELTO FOR NOW STOP XARELTO FOR NOW  Follow up DR Eliane Castro in one week  Change Toujeo dose to 36 units daily at night  Insulin patch dosing changed   Continuous glucose monitoring: Continue Dexcom G7

## 2024-11-08 NOTE — DISCHARGE NOTE PROVIDER - CARE PROVIDER_API CALL
Med Phillips  Interventional Cardiology  [address] [place] [address], [address]  [place] [address]  Phone: [phone]  Fax: [phone]  Established Patient  Follow Up Time:    Med Phillips  Interventional Cardiology  [address] [place] [address], [address]  [place] [address]  Phone: [phone]  Fax: [phone]  Established Patient  Follow Up Time:     Eliane Castro  Endocrinology/Metab/Diabetes  [address] [place] [address]  Phone: [phone]  Fax: [phone]  Established Patient  Follow Up Time:

## 2024-11-08 NOTE — DISCHARGE NOTE NURSING/CASE MANAGEMENT/SOCIAL WORK - FINANCIAL ASSISTANCE
[facility] provides services at a reduced cost to those who are determined to be eligible through [facility]’s financial assistance program. Information regarding [facility]’s financial assistance program can be found by going to [email or web address] or by calling [phone].

## 2024-11-08 NOTE — DISCHARGE NOTE PROVIDER - PROVIDER TOKENS
PROVIDER:[TOKEN:[[id number]],ESTABLISHEDPATIENT:[T]] PROVIDER:[TOKEN:[[id number]],ESTABLISHEDPATIENT:[T]],PROVIDER:[TOKEN:[[id number]],ESTABLISHEDPATIENT:[T]]

## 2024-11-08 NOTE — DISCHARGE NOTE NURSING/CASE MANAGEMENT/SOCIAL WORK - NSDCFUADDAPPT_GEN_ALL_CORE_FT
STOP XARELTO FOR NOW  Follow up DR Eliane Castro in one week  Change Toujeo dose to 36 units daily at night  Insulin patch dosing changed   Continuous glucose monitoring: Continue Dexcom G7

## 2024-11-08 NOTE — DISCHARGE NOTE PROVIDER - NSDCCPCAREPLAN_GEN_ALL_CORE_FT
PRINCIPAL DISCHARGE DIAGNOSIS  Diagnosis: CAD (coronary artery disease)  Assessment and Plan of Treatment: Start aspirin and plavix  Continue losartan and LIpitor  Follow up with your cardiologist in one week DR Phillips      SECONDARY DISCHARGE DIAGNOSES  Diagnosis: HTN (hypertension)  Assessment and Plan of Treatment: low salt, low fat, low cholesterol    Diagnosis: HLD (hyperlipidemia)  Assessment and Plan of Treatment:     Diagnosis: Type 2 diabetes mellitus with hyperglycemia  Assessment and Plan of Treatment: 1800 calorie diabetic diet/ low salt, low fat , low cholesterol   Cequr Simplicity patch/ Insulin pump: Continue insulin   Synjardy 12.5 mg-500 mg oral tablet: 1 tab(s) orally 2 times a day  Toujeo Max SoloStar 300 units/mL subcutaneous solution: 40 unit(s) subcutaneous once a day (at bedtime)  Follow up with your endocrinologist in one week     PRINCIPAL DISCHARGE DIAGNOSIS  Diagnosis: CAD (coronary artery disease)  Assessment and Plan of Treatment: Start aspirin and plavix  Continue losartan and LIpitor  Follow up with your cardiologist in one week DR Phillips or your cardiologist      SECONDARY DISCHARGE DIAGNOSES  Diagnosis: HTN (hypertension)  Assessment and Plan of Treatment: low salt, low fat, low cholesterol    Diagnosis: HLD (hyperlipidemia)  Assessment and Plan of Treatment:     Diagnosis: Type 2 diabetes mellitus with hyperglycemia  Assessment and Plan of Treatment: 1800 calorie diabetic diet/ low salt, low fat , low cholesterol   Cequr Simplicity patch/ Insulin pump: Continue insulin   Synjardy 12.5 mg-500 mg oral tablet: 1 tab(s) orally 2 times a day  Toujeo Max SoloStar 300 units/mL subcutaneous solution: 40 unit(s) subcutaneous once a day (at bedtime)  Follow up with your endocrinologist in one week

## 2024-11-08 NOTE — PROGRESS NOTE ADULT - ASSESSMENT
69M hx of HTN, HLD, Diabetes Mellitus 2 (on Insulin patch), PAD on Xarelto, COPD, [place], Peripheral neuropathy-  presents today for elective cardiac catheterization due to abnormal stress test. Patient to be admitted. Endocrine consulted T2DM/insulin patch monitoring.     #T2DM (per chart review - patient not sure what type of DM he has)  --Diagnosis: Since age 40s   --A1c: 8.3%   --Outpatient regimen: Cequr Symplicity patch (8 units before breakfast, 12 units before lunch, does take it before dinner because he was told doesn’t needle, Toujeo 36 units before dinner (last night took 32 units)  Synjardy(Empagliflozin/metformin) 12.5/500 mg PO daily     INPATIENT RECOMMENDATIONS:   - Cequr patch is currently off  - Continue Lantus 34 units at bedtime  - Continue Admelog 6 units premeal TID (HOLD IF NPO)  - Continue low admelog correctional scale premeal   - Continue low admelog correctional scale at bedtime    DISCHARGE RECOMMENDATIONS:  -- Pt understands that each click of the Cequr Symplicity patch is 2 units of fast acting insulin.   -- Resume Cequr Symplicity patch with meals once patient arrives home. Administer 8 units before breakfast, 12 units before lunch and 4 units before dinner.   - Continue Toujeo 36 units at bedtime   - Synjardy(Empagliflozin/metformin) 12.5/500 mg - increase to one tab twice daily.   - CGM: Continue Dexcom G7 at home  - OUT-PATIENT FOLLOW UP: Patient should follow up with Endocrinologist Dr Eliane Castro- pt states he has an appt in 2 weeks.     #HLD  - patient on atorvastatin 40mg  - goal LDL in diabetes mellitus is <70    #HTN  - patient on losartan 100mg daily  - goal BP in diabetes mellitus is <130/80  - defer to primary team for management      Discussed recommendations with primary team.    LINNETTE Briscoe-BC  Nurse Practitioner  Division of Endocrinology  Contact on TEAMS    If out of hospital/unavailable when paged, please note: patient will be cared for by another provider on the endocrine service.  For urgent concerns: call the endocrine answering service for assistance to reach covering provider ([phone]). For non-urgent matters: please email [email or web address] for assistance.     20

## 2024-11-08 NOTE — PROGRESS NOTE ADULT - ASSESSMENT
69 y.o. male with PMH of HTN, HLD, Diabetes Mellitus 2 (on Insulin pump), PAD on Xarelto, COPD, [place], Peripheral neuropathy-  presents today for elective cardiac catheterization due to abnormal stress test.   Pt underwent LHC 11/7: LAD (70%), s/p MATT x 1.    #CAD, s/p PCI  - HDS, VSS  - EKG: SR, no acute ischemic changes  - Access site stable w/o hematoma or bleed  - continue ASA 81mg daily, Plavix 75mg daily  for DAPT for 1 year (please send script for DAPT, 90 day supply)  - continue Atorvastatin 40mg daily  - continue Losartan 100mg daily  - Hold Metformin for 3 days to prevent Lactic Acidosis, can restart  - CLAYTON, SCr 1.32 today, continue to monitor. Further plan per primary team   - Educated patient on importance of compliance on antiplatelet therapy.  Additionally, discharge medications, access site restrictions, dietary changes/exercise discussed in-depth with patient who endorses understanding of the information and is agreement with plan. All pertinent questions answered  - Patient recommended to follow up with outpatient cardiologist in 1-2 weeks    Cardiac Rehabilitation Referral (CAD/Post PCI):     - Education on benefits of cardiac rehab provided to patient: Yes  - Referral and prescription given for cardiac rehab: Yes  - Patient given a list of locations and instructed to contact their insurance company to review list of participating providers: Yes  - Patient instructed to bring cardiac rehab prescription with them to cardiology follow up appointment for assistance with enrollment: Yes  - Patient discharged with copies detailing cardiovascular history, medications, testing/treatments: Yes       69 y.o. male with PMH of HTN, HLD, Diabetes Mellitus 2 (on Insulin pump), PAD on Xarelto, COPD, [place], Peripheral neuropathy-  presents today for elective cardiac catheterization due to abnormal stress test.   Pt underwent LHC 11/7: LAD (70%), s/p MATT x 1.    #CAD, s/p PCI  - HDS, VSS  - EKG: SR, no acute ischemic changes  - Access site stable w/o hematoma or bleed  - continue ASA 81mg daily, Plavix 75mg daily  for DAPT for 1 year (please send script for DAPT, 90 day supply)  - continue Atorvastatin 40mg daily  - continue Losartan 100mg daily  - home Xarelto discontinued post cath procedure per Dr Phillips  - A1C 8.3, on insulin pump.  Endo consulted  - CLAYTON, SCr 1.32 today, continue to monitor. Further plan per primary team   - Educated patient on importance of compliance on antiplatelet therapy.  Additionally, discharge medications, access site restrictions, dietary changes/exercise discussed in-depth with patient who endorses understanding of the information and is agreement with plan. All pertinent questions answered  - Patient recommended to follow up with outpatient cardiologist in 1-2 weeks    Cardiac Rehabilitation Referral (CAD/Post PCI):     - Education on benefits of cardiac rehab provided to patient: Yes  - Referral and prescription given for cardiac rehab: Yes  - Patient given a list of locations and instructed to contact their insurance company to review list of participating providers: Yes  - Patient instructed to bring cardiac rehab prescription with them to cardiology follow up appointment for assistance with enrollment: Yes  - Patient discharged with copies detailing cardiovascular history, medications, testing/treatments: Yes       69 y.o. male with PMH of HTN, HLD, Diabetes Mellitus 2 (on Insulin pump), PAD on Xarelto, COPD, [place], Peripheral neuropathy-  presents today for elective cardiac catheterization due to abnormal stress test.   Pt underwent LHC 11/7: LAD (70%), s/p MATT x 1.    #CAD, s/p PCI  - HDS, VSS  - EKG: SR, no acute ischemic changes  - Access site stable w/o hematoma or bleed  - continue ASA 81mg daily, Plavix 75mg daily  for DAPT for 1 year (please send script for DAPT, 90 day supply)  - continue Atorvastatin 40mg daily  - continue Losartan 100mg daily  - home Xarelto discontinued post cath procedure per Dr Phillips  - A1C 8.3, on insulin pump.  Endo consulted  - CLAYTON, SCr 1.32 today, continue to monitor. Further plan per primary team   - Educated patient on importance of compliance on antiplatelet therapy.  Additionally, discharge medications, access site restrictions, dietary changes/exercise discussed in-depth with patient who endorses understanding of the information and is agreement with plan. All pertinent questions answered  - Patient recommended to follow up with outpatient cardiologist in 1-2 weeks    #Uncontrolled Hypertension  - BP ranging 169/94 - 189/94 on 11/8 (PM). Remained  asymptomatic  - case discussed with Dr Phillips, stat dose Imdur 30mg PO x 1 dose  - continue Losartan 100mg daily and Imdur 30mg daily       Cardiac Rehabilitation Referral (CAD/Post PCI):     - Education on benefits of cardiac rehab provided to patient: Yes  - Referral and prescription given for cardiac rehab: Yes  - Patient given a list of locations and instructed to contact their insurance company to review list of participating providers: Yes  - Patient instructed to bring cardiac rehab prescription with them to cardiology follow up appointment for assistance with enrollment: Yes  - Patient discharged with copies detailing cardiovascular history, medications, testing/treatments: Yes

## 2024-11-08 NOTE — DISCHARGE NOTE PROVIDER - CARE PROVIDERS DIRECT ADDRESSES
,[email or web address]

## 2024-11-08 NOTE — DISCHARGE NOTE PROVIDER - NSDCMRMEDTOKEN_GEN_ALL_CORE_FT
Adult Aspirin Regimen 81 mg oral delayed release tablet: 1 tab(s) orally once a day  atorvastatin 20 mg oral tablet: 1 tab(s) orally once a day (at bedtime)  Breo Ellipta 100 mcg-25 mcg/inh inhalation powder: 1 inhaled once a day  gabapentin 300 mg oral capsule: 1 cap(s) orally once a day  losartan 100 mg oral tablet: 1 tab(s) orally once a day  montelukast 10 mg oral tablet: 1 tab(s) orally once a day (at bedtime)  pantoprazole 40 mg oral delayed release tablet: 1 tab(s) orally once a day  Plavix 75 mg oral tablet: 1 tab(s) orally once a day  Simplicity Insulin pump:   Synjardy 12.5 mg-500 mg oral tablet: 1 tab(s) orally 2 times a day  Toujeo Max SoloStar 300 units/mL subcutaneous solution: 40 unit(s) subcutaneous once a day (at bedtime)   Adult Aspirin Regimen 81 mg oral delayed release tablet: 1 tab(s) orally once a day  atorvastatin 20 mg oral tablet: 1 tab(s) orally once a day (at bedtime)  Breo Ellipta 100 mcg-25 mcg/inh inhalation powder: 1 inhaled once a day  gabapentin 300 mg oral capsule: 1 cap(s) orally once a day  losartan 100 mg oral tablet: 1 tab(s) orally once a day  montelukast 10 mg oral tablet: 1 tab(s) orally once a day (at bedtime)  pantoprazole 40 mg oral delayed release tablet: 1 tab(s) orally once a day  Plavix 75 mg oral tablet: 1 tab(s) orally once a day  Simplicity Insulin pump:   Synjardy 12.5 mg-500 mg oral tablet: 1 tab(s) orally 2 times a day DO NOT TAKE UNTIL 11/10/2024 MONDAY  Loulou TayloroStar 300 units/mL subcutaneous solution: 40 unit(s) subcutaneous once a day (at bedtime)   Adult Aspirin Regimen 81 mg oral delayed release tablet: 1 tab(s) orally once a day  atorvastatin 20 mg oral tablet: 1 tab(s) orally once a day (at bedtime)  Breo Ellipta 100 mcg-25 mcg/inh inhalation powder: 1 inhaled once a day  Cardiac Rehab 2-3 times weekly x 12 weeks: post PCI with Stent to the Left Anterior Descending Artery  gabapentin 300 mg oral capsule: 1 cap(s) orally once a day  losartan 100 mg oral tablet: 1 tab(s) orally once a day  montelukast 10 mg oral tablet: 1 tab(s) orally once a day (at bedtime)  pantoprazole 40 mg oral delayed release tablet: 1 tab(s) orally once a day  Plavix 75 mg oral tablet: 1 tab(s) orally once a day  Simplicity Insulin Patch: Resume  Cequr Simplicity Patch when you arrive home- 8 units before breakfast, 12 units before lunch, and 4 units before dinner  Synjardy 12.5 mg-500 mg oral tablet: 1 tab(s) orally 2 times a day DO NOT TAKE UNTIL 11/10/2024 MONDAY  Loulou TayloroStar 300 units/mL subcutaneous solution: 36 unit(s) subcutaneous once a day (at bedtime)   Adult Aspirin Regimen 81 mg oral delayed release tablet: 1 tab(s) orally once a day  atorvastatin 20 mg oral tablet: 1 tab(s) orally once a day (at bedtime)  Breo Ellipta 100 mcg-25 mcg/inh inhalation powder: 1 inhaled once a day  Cardiac Rehab 2-3 times weekly x 12 weeks: post PCI with Stent to the Left Anterior Descending Artery  gabapentin 300 mg oral capsule: 1 cap(s) orally once a day  isosorbide mononitrate 30 mg oral tablet, extended release: 1 tab(s) orally once a day  losartan 100 mg oral tablet: 1 tab(s) orally once a day  montelukast 10 mg oral tablet: 1 tab(s) orally once a day (at bedtime)  pantoprazole 40 mg oral delayed release tablet: 1 tab(s) orally once a day  Plavix 75 mg oral tablet: 1 tab(s) orally once a day  Simplicity Insulin Patch: Resume  Cequr Simplicity Patch when you arrive home- 8 units before breakfast, 12 units before lunch, and 4 units before dinner  Synjardy 12.5 mg-500 mg oral tablet: 1 tab(s) orally 2 times a day DO NOT TAKE UNTIL 11/10/2024 MONDAY  Toadalid Steele SoloStar 300 units/mL subcutaneous solution: 36 unit(s) subcutaneous once a day (at bedtime)

## 2024-11-08 NOTE — DISCHARGE NOTE NURSING/CASE MANAGEMENT/SOCIAL WORK - NSDCPEFALRISK_GEN_ALL_CORE
For information on Fall & Injury Prevention, visit: [email or web address] OR  [email or web address] OR  https://www.cdc.gov/steadi/patient.html

## 2024-11-08 NOTE — SBIRT NOTE ADULT - NSSBIRTALCPOSREINDET_GEN_A_CORE
Patient A&Ox4 stated he has two small drinks with dinner almost everyday. Patient not concerned about his drinking habits and stated no one in his family is concerned as well. . Positive reinforcement provided given patient currently within healthy guidelines. Education materials reviewed. No resources provided to patient as patient stated they are not necessary. Consult marked as complete.

## 2024-11-08 NOTE — DISCHARGE NOTE PROVIDER - HOSPITAL COURSE
69 y.o. male presents today for elective cardiac catheterization  11/7 Cardiac cath - LAD 70%=> stent x1  - ASA + Plavix  - d/c Xarelto    DM 2  - Insulin pump is removed- restarted at home  - Endocrinology consult called      case discussed with DR Phillips- patient stable for discharge home

## 2024-11-08 NOTE — CHART NOTE - NSCHARTNOTEFT_GEN_A_CORE
ROSALIE CHAVARRIA 69y Male s/p cath radial site check. Dressing is clear/dry/intact. Site is without hematoma or bleeding.   Patient denies pain, numbness, tingling, CP, SOB. VSS. Will continue to monitor.       Vital Signs Last 24 Hrs  T(C): 36.6 (07 Nov 2024 22:20), Max: 36.7 (07 Nov 2024 07:12)  T(F): 97.9 (07 Nov 2024 22:20), Max: 98.1 (07 Nov 2024 07:12)  HR: 91 (07 Nov 2024 22:20) (84 - 96)  BP: 119/82 (07 Nov 2024 22:20) (119/82 - 187/90)  RR: 18 (07 Nov 2024 22:20) (14 - 22)  SpO2: 98% (07 Nov 2024 22:20) (96% - 99%)    Parameters below as of 07 Nov 2024 22:20  Patient On (Oxygen Delivery Method): room air      Pulses palpable, cap refill <2 sec.     Tammy Dawson PA-C  Department of Medicine [id number]

## 2024-11-08 NOTE — DISCHARGE NOTE NURSING/CASE MANAGEMENT/SOCIAL WORK - PATIENT PORTAL LINK FT
You can access the FollowMyHealth Patient Portal offered by [facility] by registering at the following website: [email or web address] By joining [email or web address]’s FollowMyHealth portal, you will also be able to view your health information using other applications (apps) compatible with our system.

## 2024-11-08 NOTE — PROGRESS NOTE ADULT - SUBJECTIVE AND OBJECTIVE BOX
Patient seen and examined at bedside. HDS.  Pt denies CP, SOB, palpitations, diaphoresis, dizziness, Syncope, LE swelling, acute bleed or any other complaints at this time    Overnight Events:  None      PERTINENT REVIEW OF SYSTEMS:  Constitutional:     [ ] negative [ ] fevers [ ] chills [ ] weight loss [ ] weight gain  CV:                         [ ] negative  [ ] chest pain [ ] orthopnea [ ] palpitations [ ] murmur  Resp:                     [ ] negative [ ] cough [ ] shortness of breath [ ] dyspnea [ ] wheezing [ ] sputum [ ]hemoptysis  GI:                          [ ] negative [ ] nausea [ ] vomiting [ ] diarrhea [ ] constipation [ ] abd pain [ ] dysphagia   Skin:                       [ ] negative [ ] rash [ ] itch  Neurological:        [ ] negative [ ] headache [ ] dizziness [ ] syncope [ ] weakness [ ] numbness  Psychiatric:           [ ] negative [ ] anxiety [ ] depression  Endocrine:            [ ] negative [ ] diabetes [ ] thyroid problem  Heme/Lymph:      [ ] negative [ ] anemia [ ] bleeding problem  Allergic/Immune: [ ] negative [ ] itchy eyes [ ] nasal discharge [ ] hives [ ] angioedema    [x] All other systems negative  [ ] Unable to assess ROS due to    Home Medications  Home Medications:  atorvastatin 20 mg oral tablet: 1 tab(s) orally once a day (at bedtime) (07 Nov 2024 09:14)  Breo Ellipta 100 mcg-25 mcg/inh inhalation powder: 1 inhaled once a day (07 Nov 2024 09:14)  gabapentin 300 mg oral capsule: 1 cap(s) orally once a day (07 Nov 2024 09:14)  losartan 100 mg oral tablet: 1 tab(s) orally once a day (07 Nov 2024 09:14)  montelukast 10 mg oral tablet: 1 tab(s) orally once a day (at bedtime) (07 Nov 2024 10:14)  pantoprazole 40 mg oral delayed release tablet: 1 tab(s) orally once a day (07 Nov 2024 10:14)  Synjardy 12.5 mg-500 mg oral tablet: 1 tab(s) orally 2 times a day DO NOT TAKE UNTIL 11/10/2024 MONDAY (08 Nov 2024 08:10)  Toujeo Max SoloStar 300 units/mL subcutaneous solution: 36 unit(s) subcutaneous once a day (at bedtime) (08 Nov 2024 10:49)      Current Meds:  acetaminophen     Tablet .. 650 milliGRAM(s) Oral every 6 hours PRN  aspirin enteric coated 81 milliGRAM(s) Oral daily  atorvastatin 40 milliGRAM(s) Oral at bedtime  clopidogrel Tablet 75 milliGRAM(s) Oral daily  dextrose 5%. 1000 milliLiter(s) IV Continuous <Continuous>  dextrose 5%. 1000 milliLiter(s) IV Continuous <Continuous>  dextrose 50% Injectable 25 Gram(s) IV Push once  dextrose 50% Injectable 25 Gram(s) IV Push once  dextrose 50% Injectable 12.5 Gram(s) IV Push once  dextrose Oral Gel 15 Gram(s) Oral once PRN  fluticasone propionate/ salmeterol 100-50 MICROgram(s) Diskus 1 Dose(s) Inhalation two times a day  gabapentin 300 milliGRAM(s) Oral daily  glucagon  Injectable 1 milliGRAM(s) IntraMuscular once  insulin glargine Injectable (LANTUS) 34 Unit(s) SubCutaneous at bedtime  insulin lispro (ADMELOG) corrective regimen sliding scale   SubCutaneous at bedtime  insulin lispro (ADMELOG) corrective regimen sliding scale   SubCutaneous three times a day before meals  insulin lispro Injectable (ADMELOG) 6 Unit(s) SubCutaneous three times a day before meals  losartan 100 milliGRAM(s) Oral daily  montelukast 10 milliGRAM(s) Oral at bedtime  pantoprazole    Tablet 40 milliGRAM(s) Oral before breakfast  sodium chloride 0.9% lock flush 3 milliLiter(s) IV Push every 8 hours  sodium chloride 0.9%. 1000 milliLiter(s) IV Continuous <Continuous>  sodium chloride 0.9%. 500 milliLiter(s) IV Continuous <Continuous>    PAST MEDICAL & SURGICAL HISTORY:  HTN (hypertension), benign      HLD (hyperlipidemia)      DM2 (diabetes mellitus, type 2)      Chronic obstructive pulmonary disease, unspecified COPD type      Peripheral neuropathy      [place] (hard of hearing)      Diabetes mellitus type 2, uncomplicated, on long term insulin pump      Perforated eardrum      H/O foot surgery      Status post peripheral artery angioplasty      Vitals:  T(F): 97.7 (11-08), Max: 98 (11-08)  HR: 91 (11-08) (83 - 92)  BP: 162/83 (11-08) (119/82 - 177/96)  RR: 18 (11-08)  SpO2: 95% (11-08)  I&O's Summary    07 Nov 2024 07:01  -  08 Nov 2024 07:00  --------------------------------------------------------  IN: 100 mL / OUT: 0 mL / NET: 100 mL      Physical Exam:  Appearance: No acute distress; well appearing  Neck: Supple, no JVD B/L, no Carotid Bruit B/L  Cardiovascular: RRR, S1, S2, no murmurs, rubs, or gallops, no edema  Respiratory: Clear to auscultation bilaterally, no RRW B/L  Gastrointestinal: soft, non-tender, non-distended with normal bowel sounds  Neurologic: No focal or neuro deficits noted  Psychiatry: AAOx3, mood & affect appropriate  Access Site: RRA.  Stable, C/D/I, w/o hematoma or bleeding, pulses intact, back to baseline                          15.2   5.63  )-----------( 109      ( 08 Nov 2024 06:10 )             47.1     11-08    137  |  103  |  23  ----------------------------<  152[H]  4.3   |  22  |  1.32[H]    Ca    9.4      08 Nov 2024 06:10          Telemetry:  
Chief Complaint: DM2     Interval Events: Pt seen and examined at bedside. Pt tolerating diet. Discussed pt's Symplicity patch and he expresses understanding to be used before meals. Pt understands that each click of the Cequr Symplicity patch is 2 units of fast acting insulin.   Discussed pts A1C and he reports it was most recently in high 8s and he has been working with his endocrinologist Dr. Castro to bring it down.     MEDICATIONS  (STANDING):  aspirin enteric coated 81 milliGRAM(s) Oral daily  atorvastatin 40 milliGRAM(s) Oral at bedtime  clopidogrel Tablet 75 milliGRAM(s) Oral daily  dextrose 5%. 1000 milliLiter(s) (50 mL/Hr) IV Continuous <Continuous>  dextrose 5%. 1000 milliLiter(s) (100 mL/Hr) IV Continuous <Continuous>  dextrose 50% Injectable 25 Gram(s) IV Push once  dextrose 50% Injectable 12.5 Gram(s) IV Push once  dextrose 50% Injectable 25 Gram(s) IV Push once  fluticasone propionate/ salmeterol 100-50 MICROgram(s) Diskus 1 Dose(s) Inhalation two times a day  gabapentin 300 milliGRAM(s) Oral daily  glucagon  Injectable 1 milliGRAM(s) IntraMuscular once  insulin glargine Injectable (LANTUS) 34 Unit(s) SubCutaneous at bedtime  insulin lispro (ADMELOG) corrective regimen sliding scale   SubCutaneous at bedtime  insulin lispro (ADMELOG) corrective regimen sliding scale   SubCutaneous three times a day before meals  insulin lispro Injectable (ADMELOG) 6 Unit(s) SubCutaneous three times a day before meals  losartan 100 milliGRAM(s) Oral daily  montelukast 10 milliGRAM(s) Oral at bedtime  pantoprazole    Tablet 40 milliGRAM(s) Oral before breakfast  sodium chloride 0.9% lock flush 3 milliLiter(s) IV Push every 8 hours  sodium chloride 0.9%. 1000 milliLiter(s) (100 mL/Hr) IV Continuous <Continuous>  sodium chloride 0.9%. 500 milliLiter(s) (75 mL/Hr) IV Continuous <Continuous>    MEDICATIONS  (PRN):  acetaminophen     Tablet .. 650 milliGRAM(s) Oral every 6 hours PRN Mild Pain (1 - 3)  dextrose Oral Gel 15 Gram(s) Oral once PRN Blood Glucose LESS THAN 70 milliGRAM(s)/deciliter      Allergies    pork (Urticaria)  Beef (Urticaria)  No Known Drug Allergies    Intolerances      Review of Systems:  Eyes: No blurry vision  Cardiovascular: No chest pain, palpitations  Respiratory: No SOB, no cough  GI: No nausea, vomiting, abdominal pain  : No dysuria  Psych: no depression  Endocrine: no polyuria, polydipsia  ALL OTHER SYSTEMS REVIEWED AND NEGATIVE    VITALS: T(C): 36.7 (11-08-24 @ 09:00)  T(F): 98 (11-08-24 @ 09:00), Max: 98 (11-08-24 @ 09:00)  HR: 91 (11-08-24 @ 09:00) (83 - 96)  BP: 151/83 (11-08-24 @ 09:00) (119/82 - 177/96)  RR:  (14 - 21)  SpO2:  (97% - 99%)  Wt(kg): --    T(C): 36.7 (11-08-24 @ 09:00), Max: 36.7 (11-08-24 @ 09:00)  HR: 91 (11-08-24 @ 09:00) (83 - 96)  BP: 151/83 (11-08-24 @ 09:00) (119/82 - 177/96)  RR: 18 (11-08-24 @ 09:00) (14 - 21)  SpO2: 99% (11-08-24 @ 09:00) (97% - 99%)    Physical Exam:   GENERAL: NAD, well-developed  EYES: No proptosis  HEENT:  Atraumatic, Normocephalic  RESPIRATORY: non labored breathing   CV: HR regular on tele monitor  GI: Non distended  PSYCH: Alert, normal affect, normal mood    CAPILLARY BLOOD GLUCOSE    POCT Blood Glucose.: 134 mg/dL (08 Nov 2024 07:47)  POCT Blood Glucose.: 169 mg/dL (07 Nov 2024 22:28)  POCT Blood Glucose.: 179 mg/dL (07 Nov 2024 17:23)  POCT Blood Glucose.: 224 mg/dL (07 Nov 2024 14:17)      11-08    137  |  103  |  23  ----------------------------<  152[H]  4.3   |  22  |  1.32[H]    eGFR: 58[L]    Ca    9.4      11-08        A1C with Estimated Average Glucose Result: 8.1 % (11-08-24 @ 06:10)  A1C with Estimated Average Glucose Result: 8.3 % (11-07-24 @ 07:12)      Thyroid Function Tests:

## 2024-11-08 NOTE — PROGRESS NOTE ADULT - NS ATTEND AMEND GEN_ALL_CORE FT
69M DM2 on Cequr simplicity patch pump here for workup of abnormal stress test. Inpatient off patch pump. Agree with basal bolus insulin as outlined in hospital. Patient for discharge today. Resume Cequr patch pump once home and adjusted home regimen to add 2 clicks (4 units) before dinner in addition to home insulin regimen, plus increase Synjardy 12.5-500 to 1 tab BID.    Radha Nagy MD  Division of Endocrinology  Pager: [phone]    If after 6PM or before 9AM, or on weekends/holidays, please call endocrine answering service for assistance ([phone]).  For nonurgent matters email [email or web address] for assistance.

## 2024-11-14 PROBLEM — Z00.00 ENCOUNTER FOR PREVENTIVE HEALTH EXAMINATION: Status: ACTIVE | Noted: 2024-11-14

## 2024-11-25 ENCOUNTER — APPOINTMENT (OUTPATIENT)
Dept: CARDIOLOGY | Facility: CLINIC | Age: 69
End: 2024-11-25
